# Patient Record
Sex: FEMALE | Race: OTHER | HISPANIC OR LATINO | Employment: STUDENT | URBAN - METROPOLITAN AREA
[De-identification: names, ages, dates, MRNs, and addresses within clinical notes are randomized per-mention and may not be internally consistent; named-entity substitution may affect disease eponyms.]

---

## 2021-02-09 ENCOUNTER — HOSPITAL ENCOUNTER (OUTPATIENT)
Dept: LAB | Facility: MEDICAL CENTER | Age: 21
End: 2021-02-09
Attending: NURSE PRACTITIONER
Payer: COMMERCIAL

## 2021-02-09 LAB
ALBUMIN SERPL BCP-MCNC: 4.6 G/DL (ref 3.2–4.9)
ALBUMIN/GLOB SERPL: 1.5 G/DL
ALP SERPL-CCNC: 66 U/L (ref 30–99)
ALT SERPL-CCNC: 104 U/L (ref 2–50)
ANION GAP SERPL CALC-SCNC: 11 MMOL/L (ref 7–16)
AST SERPL-CCNC: 34 U/L (ref 12–45)
BASOPHILS # BLD AUTO: 0.3 % (ref 0–1.8)
BASOPHILS # BLD: 0.02 K/UL (ref 0–0.12)
BILIRUB SERPL-MCNC: 0.5 MG/DL (ref 0.1–1.5)
BUN SERPL-MCNC: 10 MG/DL (ref 8–22)
CALCIUM SERPL-MCNC: 9.5 MG/DL (ref 8.5–10.5)
CHLORIDE SERPL-SCNC: 101 MMOL/L (ref 96–112)
CHOLEST SERPL-MCNC: 177 MG/DL (ref 100–199)
CO2 SERPL-SCNC: 24 MMOL/L (ref 20–33)
CREAT SERPL-MCNC: 0.45 MG/DL (ref 0.5–1.4)
EOSINOPHIL # BLD AUTO: 0.09 K/UL (ref 0–0.51)
EOSINOPHIL NFR BLD: 1.3 % (ref 0–6.9)
ERYTHROCYTE [DISTWIDTH] IN BLOOD BY AUTOMATED COUNT: 44.1 FL (ref 35.9–50)
EST. AVERAGE GLUCOSE BLD GHB EST-MCNC: 111 MG/DL
GLOBULIN SER CALC-MCNC: 3.1 G/DL (ref 1.9–3.5)
GLUCOSE SERPL-MCNC: 89 MG/DL (ref 65–99)
HBA1C MFR BLD: 5.5 % (ref 0–5.6)
HCT VFR BLD AUTO: 44.2 % (ref 37–47)
HDLC SERPL-MCNC: 56 MG/DL
HGB BLD-MCNC: 14.2 G/DL (ref 12–16)
IMM GRANULOCYTES # BLD AUTO: 0.01 K/UL (ref 0–0.11)
IMM GRANULOCYTES NFR BLD AUTO: 0.1 % (ref 0–0.9)
LDLC SERPL CALC-MCNC: 100 MG/DL
LYMPHOCYTES # BLD AUTO: 2.47 K/UL (ref 1–4.8)
LYMPHOCYTES NFR BLD: 35.3 % (ref 22–41)
MCH RBC QN AUTO: 30 PG (ref 27–33)
MCHC RBC AUTO-ENTMCNC: 32.1 G/DL (ref 33.6–35)
MCV RBC AUTO: 93.4 FL (ref 81.4–97.8)
MONOCYTES # BLD AUTO: 0.34 K/UL (ref 0–0.85)
MONOCYTES NFR BLD AUTO: 4.9 % (ref 0–13.4)
NEUTROPHILS # BLD AUTO: 4.06 K/UL (ref 2–7.15)
NEUTROPHILS NFR BLD: 58.1 % (ref 44–72)
NRBC # BLD AUTO: 0 K/UL
NRBC BLD-RTO: 0 /100 WBC
PLATELET # BLD AUTO: 235 K/UL (ref 164–446)
PMV BLD AUTO: 9.5 FL (ref 9–12.9)
POTASSIUM SERPL-SCNC: 3.6 MMOL/L (ref 3.6–5.5)
PROT SERPL-MCNC: 7.7 G/DL (ref 6–8.2)
RBC # BLD AUTO: 4.73 M/UL (ref 4.2–5.4)
SODIUM SERPL-SCNC: 136 MMOL/L (ref 135–145)
TRIGL SERPL-MCNC: 105 MG/DL (ref 0–149)
TSH SERPL DL<=0.005 MIU/L-ACNC: 1.28 UIU/ML (ref 0.38–5.33)
WBC # BLD AUTO: 7 K/UL (ref 4.8–10.8)

## 2021-02-09 PROCEDURE — 80061 LIPID PANEL: CPT

## 2021-02-09 PROCEDURE — 83036 HEMOGLOBIN GLYCOSYLATED A1C: CPT

## 2021-02-09 PROCEDURE — 85025 COMPLETE CBC W/AUTO DIFF WBC: CPT

## 2021-02-09 PROCEDURE — 36415 COLL VENOUS BLD VENIPUNCTURE: CPT

## 2021-02-09 PROCEDURE — 80053 COMPREHEN METABOLIC PANEL: CPT

## 2021-02-09 PROCEDURE — 84443 ASSAY THYROID STIM HORMONE: CPT

## 2024-11-02 ENCOUNTER — OFFICE VISIT (OUTPATIENT)
Dept: URGENT CARE | Facility: CLINIC | Age: 24
End: 2024-11-02
Payer: COMMERCIAL

## 2024-11-02 VITALS
HEIGHT: 63 IN | SYSTOLIC BLOOD PRESSURE: 116 MMHG | OXYGEN SATURATION: 98 % | DIASTOLIC BLOOD PRESSURE: 70 MMHG | TEMPERATURE: 98.1 F | HEART RATE: 65 BPM | BODY MASS INDEX: 33.66 KG/M2 | WEIGHT: 190 LBS | RESPIRATION RATE: 18 BRPM
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
## 2024-11-02 PROCEDURE — 3078F DIAST BP <80 MM HG: CPT | Performed by: STUDENT IN AN ORGANIZED HEALTH CARE EDUCATION/TRAINING PROGRAM

## 2024-11-02 PROCEDURE — 99203 OFFICE O/P NEW LOW 30 MIN: CPT | Performed by: STUDENT IN AN ORGANIZED HEALTH CARE EDUCATION/TRAINING PROGRAM

## 2024-11-02 PROCEDURE — 3074F SYST BP LT 130 MM HG: CPT | Performed by: STUDENT IN AN ORGANIZED HEALTH CARE EDUCATION/TRAINING PROGRAM

## 2024-11-02 ASSESSMENT — ENCOUNTER SYMPTOMS
TINGLING: 0
FEVER: 0
CHILLS: 0
SENSORY CHANGE: 0
WEAKNESS: 0

## 2024-11-02 NOTE — LETTER
PHYSICIAN’S AND CHIROPRACTIC PHYSICIAN'S   PROGRESS REPORT   CERTIFICATION OF DISABILITY Claim Number:     Social Security Number:    Patient’s Name: Gabbi Man Date of Injury: 11/2/2024   Employer:   Name of MCO (if applicable):      Patient’s Job Description/Occupation: canvasser       Previous Injuries/Diseases/Surgeries Contributing to the Condition:         Diagnosis: (W54.0XXA) Dog bite, initial encounter      Related to the Industrial Injury? Yes     Explain:        Objective Medical Findings: Puncture wound to posterior aspect of of lower leg secondary to dog bit. No active bleeding.  No surrounding erythema or swelling.  Minimally tender. FROM right leg. Gait intact.         None - Discharged                         Stable  No                 Ratable  No        Generally Improved                         Condition Worsened                  Condition Same  May Have Suffered a Permanent Disability No     Treatment Plan:    Augmentin, OTC tylenol/ibuprofen         No Change in Therapy                  PT/OT Prescribed                      Medication May be Used While Working        Case Management                          PT/OT Discontinued    Consultation    Further Diagnostic Studies:    Prescription(s)               X  Released to FULL DUTY /No Restrictions on (Date):       Certified TOTALLY TEMPORARILY DISABLED (Indicate Dates) From:   To:      Released to RESTRICTED/Modified Duty on (Date): From:   To:    Restrictions Are:  Temporary      No Sitting    No Standing    No Pulling Other:         No Bending at Waist     No Stooping     No Lifting        No Carrying     No Walking Lifting Restricted to (lbs.):          No Pushing        No Climbing     No Reaching Above Shoulders       Date of Next Visit:  11/5/2024 Date of this Exam: 11/2/2024 Physician/Chiropractic Physician Name: Patrica Camacho P.A.-C. Physician/Chiropractic Physician Signature:  Venancio Beltre DO MPH     [place]:   [address]  [facility], [address] [place], [place] [address] - Telephone [phone] [place]:  [address]  [facility], [address] [place], [place] [address] - Telephone [phone]    [email or web address]  D-39 (Rev. 10/24)

## 2024-11-02 NOTE — LETTER
"    EMPLOYEE’S CLAIM FOR COMPENSATION/ REPORT OF INITIAL TREATMENT  FORM C-4  PLEASE TYPE OR PRINT    EMPLOYEE’S CLAIM - PROVIDE ALL INFORMATION REQUESTED   First Name                    INNA Seo                  Last Name  Kenzie Man Birthdate                    2000                Sex  Female Claim Number (Insurer’s Use Only)     Home Address  [address] Age  23 y.o. Height  1.6 m (5' 3\") Weight  86.2 kg (190 lb) Social Security Number     [facility] Zip  [address] Telephone  There are no phone numbers on file.   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 (if applicable)  canvassing (knocking on doors voting)    How did this injury or occupational disease occur? (Be specific and answer in detail. Use additional sheet if necessary)  I knocked on the door. Man only said his big dog was kind to be scared. Then the big dog got close to me, while their small dog ran out and jumped and bit my right leg behing my knee   If you believe that you have an occupational disease, when did you first have knowledge of the disability and its relationship to your employment?  na Witnesses to the Accident (if applicable)  the owner, and coworker.      Nature of " Injury or Occupational Disease  Inflammation  Part(s) of Body Injured or Affected  Lower Leg (R) N/A N/A    I CERTIFY THAT THE ABOVE IS TRUE AND CORRECT TO T HE BEST OF MY KNOWLEDGE AND THAT I HAVE PROVIDED THIS INFORMATION IN ORDER TO OBTAIN THE BENEFITS OF [place]CC[place], OR CHAPTER 617 OF [place]RS).  I HEREBY AUTHORIZE ANY PHYSICIAN, CHIROPRACTOR, SURGEON, PRACTITIONER OR ANY OTHER PERSON, ANY HOSPITAL, INCLUDING [facility] OR [facility], ANY  MEDICAL SERVICE ORGANIZATION, ANY INSURANCE COMPANY, OR OTHER INSTITUTION OR ORGANIZATION TO RELEASE TO EACH OTHER, ANY MEDICAL OR OTHER INFORMATION, INCLUDING BENEFITS PAID OR PAYABLE, PERTINENT TO THIS INJURY OR DISEASE, EXCEPT INFORMATION RELATIVE TO DIAGNOSIS, TREATMENT AND/OR COUNSELING FOR AIDS, PSYCHOLOGICAL CONDITIONS, ALCOHOL OR CONTROLLED SUBSTANCES, FOR WHICH I MUST GIVE SPECIFIC AUTHORIZATION.  A PHOTOSTAT OF THIS AUTHORIZATION SHALL BE VALID AS THE ORIGINAL.     Date   Place Employee’s Original or  *Electronic Signature   THIS REPORT MUST BE COMPLETED AND MAILED WITHIN 3 WORKING DAYS OF TREATMENT   Place  [facility]    Name of [facility]   Date 11/2/2024 Diagnosis and Description of Injury or Occupational Disease  (W54.0XXA) Dog bite, initial encounter  The encounter diagnosis was Dog bite, initial encounter. Is there evidence that the injured employee was under the influence of alcohol and/or another controlled substance at the time of accident?  []No  [] Yes (if yes, please explain)   Hour 7:22 PM  No   Treatment:      Have you advised the patient to remain off work five days or more?   [] Yes Indicate dates: From   To    [] No      If no, is the injured employee capable of: [] full duty [] modified duty                     If modified duty, specify any limitations / restrictions:                                                                                                                                                                                                                                                                                                                                                                                                                   X-Ray Findings:      From information given by the employee, together with medical evidence, can you directly connect this injury or occupational disease as job incurred?  []Yes   [] No Yes    Is additional medical care by a physician indicated? []Yes [] No  Yes    Do you know of any previous injury or disease contributing to this condition or occupational disease? []Yes [] No (Explain if yes)                          No   Date  11/2/2024 Print Health Care Provider’s Name  Patrica Camacho P.A.-C. I certify that the employer’s copy of  this form was delivered to the employer on:   Address  [address] [address] INSURER'S USE ONLY                       [facility]  [address] Provider’s Tax ID Number  [id number]   Telephone  Dept: [phone]    Health Care Provider’s Original or Electronic Signature  salima-PATRICA Morfin P.A.-C. Degree (MD,DO, DC,PABrennaC,APRN)  PANARAYAN  Choose (if applicable)      ORIGINAL - TREATING HEALTHCARE PROVIDER PAGE 2 - INSURER/TPA PAGE 3 - EMPLOYER PAGE 4 - EMPLOYEE             Form C-4 (rev.08/23)

## 2024-11-02 NOTE — LETTER
PHYSICIAN’S AND CHIROPRACTIC PHYSICIAN'S   PROGRESS REPORT   CERTIFICATION OF DISABILITY Claim Number:     Social Security Number:    Patient’s Name: Gabbi Man Date of Injury: 11/2/2024   Employer:  [employer] [place] Name of MCO (if applicable):      Patient’s Job Description/Occupation: canvasser       Previous Injuries/Diseases/Surgeries Contributing to the Condition:         Diagnosis: (W54.0XXA) Dog bite, initial encounter      Related to the Industrial Injury? Yes     Explain:        Objective Medical Findings: Puncture wound to posterior aspect of of lower leg secondary to dog bit. No active bleeding.  No surrounding erythema or swelling.  Minimally tender. FROM right leg. Gait intact.         None - Discharged                         Stable  No                 Ratable  No        Generally Improved                         Condition Worsened                  Condition Same  May Have Suffered a Permanent Disability No     Treatment Plan:    Augmentin, OTC tylenol/ibuprofen         No Change in Therapy                  PT/OT Prescribed                      Medication May be Used While Working        Case Management                          PT/OT Discontinued    Consultation    Further Diagnostic Studies:    Prescription(s)               X  Released to FULL DUTY /No Restrictions on (Date):       Certified TOTALLY TEMPORARILY DISABLED (Indicate Dates) From:   To:      Released to RESTRICTED/Modified Duty on (Date): From:   To:    Restrictions Are:         No Sitting    No Standing    No Pulling Other:         No Bending at Waist     No Stooping     No Lifting        No Carrying     No Walking Lifting Restricted to (lbs.):          No Pushing        No Climbing     No Reaching Above Shoulders       Date of Next Visit:  11/5/2024 Date of this Exam: 11/2/2024 Physician/Chiropractic Physician Name: Patrica Camacho P.A.-C. Physician/Chiropractic Physician Signature:  DO NNEKA Cuevas  City:  [address] [place] [address], [address] [place], [place] [address] - Telephone [phone] [place]:  [address]  [facility] [address], [address] [place], [place] [address] - Telephone [phone]    [email or web address]  D-39 (Rev. 10/24)

## 2024-11-03 NOTE — PROGRESS NOTES
"Subjective     Gabbi Man is a 23 y.o. female who presents with Animal Bite (Bit in the back of R knee; unknown if dog had shops. 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  Work sent her here for evaluation.  Reports a little stinging initially after bite but now she has no pain. UTD on tetanus    HPI    Review of Systems   Constitutional:  Negative for chills and fever.   Musculoskeletal:  Negative for joint pain.   Neurological:  Negative for tingling, sensory change and weakness.   All other systems reviewed and are negative.             Objective     /70   Pulse 65   Temp 36.7 °C (98.1 °F)   Resp 18   Ht 1.6 m (5' 3\")   Wt 86.2 kg (190 lb)   SpO2 98%   BMI 33.66 kg/m²      Physical Exam  Vitals reviewed.   Constitutional:       Appearance: Normal appearance.   HENT:      Head: Normocephalic and atraumatic.      Nose: Nose normal.   Eyes:      Extraocular Movements: Extraocular movements intact.      Conjunctiva/sclera: Conjunctivae normal.      Pupils: Pupils are equal, round, and reactive to light.   Cardiovascular:      Rate and Rhythm: Normal rate.   Pulmonary:      Effort: Pulmonary effort is normal.   Musculoskeletal:         General: Normal range of motion.      Comments: Puncture wound to posterior aspect of of lower leg secondary to dog bit. No active bleeding.  No surrounding erythema or swelling.  Minimally tender. FROM right leg.   Skin:     General: Skin is warm and dry.   Neurological:      General: No focal deficit present.      Mental Status: She is alert and oriented to person, place, and time.                             Assessment & Plan        Assessment & Plan  Dog bite, initial encounter    Orders:    amoxicillin-clavulanate (AUGMENTIN) 875-125 MG Tab; Take 1 Tablet by mouth 2 times a day for 7 days.         C4 and D39 completed.  No work restrictions.   Follow up in " 3 days.      Differential diagnoses, supportive care measures (rest, ice, OTC Tylenol/ibuprofen) and indications for immediate follow-up discussed with patient. Pathogenesis of diagnosis discussed including typical length and natural progression.      Instructed to return to urgent care or nearest emergency department if symptoms fail to improve, for any change in condition, further concerns, or new concerning symptoms.    Patient states understanding and agrees with the plan of care and discharge instructions.

## 2024-11-06 ENCOUNTER — OCCUPATIONAL MEDICINE (OUTPATIENT)
Dept: URGENT CARE | Facility: CLINIC | Age: 24
End: 2024-11-06
Payer: COMMERCIAL

## 2024-11-06 VITALS
WEIGHT: 190 LBS | TEMPERATURE: 97.3 F | SYSTOLIC BLOOD PRESSURE: 102 MMHG | DIASTOLIC BLOOD PRESSURE: 62 MMHG | HEIGHT: 63 IN | OXYGEN SATURATION: 96 % | BODY MASS INDEX: 33.66 KG/M2 | HEART RATE: 59 BPM | RESPIRATION RATE: 17 BRPM

## 2024-11-06 DIAGNOSIS — S81.851D DOG BITE OF RIGHT LOWER LEG, SUBSEQUENT ENCOUNTER: ICD-10-CM
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
## 2024-11-06 PROCEDURE — 3078F DIAST BP <80 MM HG: CPT | Performed by: NURSE PRACTITIONER

## 2024-11-06 PROCEDURE — 99213 OFFICE O/P EST LOW 20 MIN: CPT | Performed by: NURSE PRACTITIONER

## 2024-11-06 PROCEDURE — 3074F SYST BP LT 130 MM HG: CPT | Performed by: NURSE PRACTITIONER

## 2024-11-06 NOTE — LETTER
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 \"  Patient returns urgent care for follow-up visit having improvement in symptoms.  Currently taking the antibiotics as directed.  Has no signs of infection.  Has no pain.        Diagnosis: (S81.851D,  W54.0XXD) Dog bite of right lower leg, subsequent encounter      Related to the Industrial Injury? Yes     Explain:        Objective Medical Findings: Right leg: Scabbed lesion posterior aspect right lower leg just distal to the knee.  No erythema, no induration.  No pain.  No discharge.         None - Discharged                         Stable  No                 Ratable  No     X   Generally Improved                         Condition Worsened                  Condition Same  May Have Suffered a Permanent Disability No     Treatment Plan:    Encouraged to finish course of antibiotics as directed.  Patient will be released to full duty without restrictions and discharged MMI         No Change in Therapy                  PT/OT Prescribed                      Medication May be Used While Working        Case Management                          PT/OT Discontinued    Consultation    Further Diagnostic Studies:    Prescription(s)               X  Released to FULL DUTY /No Restrictions on (Date):  11/6/2024    Certified TOTALLY TEMPORARILY DISABLED (Indicate Dates) From:   To:      Released to RESTRICTED/Modified Duty on (Date): From:   To:    Restrictions Are:  Permanent      No Sitting    No Standing    No Pulling Other:   "       No Bending at Waist     No Stooping     No Lifting        No Carrying     No Walking Lifting Restricted to (lbs.):          No Pushing        No Climbing     No Reaching Above Shoulders       Date of Next Visit:   N/A Date of this Exam: 11/6/2024 Physician/Chiropractic Physician Name: JUVENCIO Rodriguez Physician/Chiropractic Physician Signature:  Venancio Beltre DO MPH     [place]:  [facility]  [facility], [address] [place], [place] [address] - Telephone [phone] [place]:  [address] [place] [address], [address] [place], [place] [address] - Telephone [phone]    [email or web address]  D-39 (Rev. 10/24)

## 2024-11-07 NOTE — PROGRESS NOTES
"Subjective:   Gabbi Man is a 23 y.o. 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 \"  Patient returns urgent care for follow-up visit having improvement in symptoms.  Currently taking the antibiotics as directed.  Has no signs of infection.  Has no pain.    Review of Systems   Musculoskeletal:  Negative for joint pain.   Skin:         Dog bite right leg         Medications:    amoxicillin-clavulanate Tabs    Allergies: Latex    Problem List: Gabbi Man does not have a problem list on file.    Surgical History:  No past surgical history on file.    Past Social Hx: Gabbi Man  reports that she has never smoked. She has never used smokeless tobacco. She reports current alcohol use. She reports that she does not use drugs.     Past Family Hx:  Gabbi Man family history is not on file.     Problem list, medications, and allergies reviewed by myself today in Epic.     Objective:     /62 (BP Location: Left arm, Patient Position: Sitting, BP Cuff Size: Adult)   Pulse (!) 59   Temp 36.3 °C (97.3 °F) (Temporal)   Resp 17   Ht 1.6 m (5' 3\")   Wt 86.2 kg (190 lb)   SpO2 96%   BMI 33.66 kg/m²     Physical Exam  Constitutional:       Appearance: Normal appearance. She is not ill-appearing or toxic-appearing.   HENT:      Head: Normocephalic.      Right Ear: External ear normal.      Left Ear: External ear normal.      Nose: Nose normal.      Mouth/Throat:      Lips: Pink.   Eyes:      General: Lids are normal.   Pulmonary:      Effort: Pulmonary effort is normal. No accessory muscle usage.   Musculoskeletal:      Cervical back: Full passive range of motion without pain.   Skin:     Findings: Wound (healing) present.   Neurological:      Mental Status: She is alert and oriented to person, place, and time. "   Psychiatric:         Mood and Affect: Mood normal.         Thought Content: Thought content normal.           Right leg: Scabbed lesion posterior aspect right lower leg just distal to the knee.  No erythema, no induration.  No pain.  No discharge.      Assessment/Plan:     Diagnosis and associated orders:     1. Dog bite of right lower leg, subsequent encounter           Comments/MDM:       Encouraged to finish course of antibiotics as directed.  Patient will be released to full duty without restrictions and discharged MMI  Differential diagnosis, natural history, supportive care, and indications for immediate follow-up discussed.             Please note that this dictation was created using voice recognition software. I have made a reasonable attempt to correct obvious errors, but I expect that there are errors of grammar and possibly content that I did not discover before finalizing the note.    This note was electronically signed by Brent COOK.

## 2025-02-21 ENCOUNTER — HOSPITAL ENCOUNTER (OUTPATIENT)
Dept: LAB | Facility: MEDICAL CENTER | Age: 25
End: 2025-02-21
Attending: NEUROLOGICAL SURGERY
Payer: MEDICAID

## 2025-02-21 LAB
FSH SERPL-ACNC: 6.3 MIU/ML
LH SERPL-ACNC: 3.5 IU/L
PROLACTIN SERPL-MCNC: 26.4 NG/ML (ref 2.8–26)
T4 FREE SERPL-MCNC: 1.17 NG/DL (ref 0.93–1.7)
TSH SERPL-ACNC: 2.58 UIU/ML (ref 0.35–5.5)

## 2025-02-21 PROCEDURE — 84443 ASSAY THYROID STIM HORMONE: CPT

## 2025-02-21 PROCEDURE — 83001 ASSAY OF GONADOTROPIN (FSH): CPT

## 2025-02-21 PROCEDURE — 83002 ASSAY OF GONADOTROPIN (LH): CPT

## 2025-02-21 PROCEDURE — 83003 ASSAY GROWTH HORMONE (HGH): CPT

## 2025-02-21 PROCEDURE — 36415 COLL VENOUS BLD VENIPUNCTURE: CPT

## 2025-02-21 PROCEDURE — 84146 ASSAY OF PROLACTIN: CPT

## 2025-02-21 PROCEDURE — 84305 ASSAY OF SOMATOMEDIN: CPT

## 2025-02-21 PROCEDURE — 84439 ASSAY OF FREE THYROXINE: CPT

## 2025-02-23 LAB
IGF-I SERPL-MCNC: 204 NG/ML (ref 103–326)
IGF-I Z-SCORE SERPL: 0

## 2025-02-24 LAB — GHRH SERPL-MCNC: <0.05 NG/ML (ref 0.05–8)

## 2025-04-19 ENCOUNTER — OFFICE VISIT (OUTPATIENT)
Dept: URGENT CARE | Facility: CLINIC | Age: 25
End: 2025-04-19
Payer: MEDICAID

## 2025-04-19 VITALS
TEMPERATURE: 98.1 F | BODY MASS INDEX: 31.41 KG/M2 | SYSTOLIC BLOOD PRESSURE: 124 MMHG | HEIGHT: 63 IN | WEIGHT: 177.3 LBS | DIASTOLIC BLOOD PRESSURE: 82 MMHG | OXYGEN SATURATION: 98 % | HEART RATE: 60 BPM | RESPIRATION RATE: 18 BRPM

## 2025-04-19 DIAGNOSIS — S16.1XXA NECK STRAIN, INITIAL ENCOUNTER: ICD-10-CM

## 2025-04-19 PROCEDURE — 3079F DIAST BP 80-89 MM HG: CPT | Performed by: PHYSICIAN ASSISTANT

## 2025-04-19 PROCEDURE — 3074F SYST BP LT 130 MM HG: CPT | Performed by: PHYSICIAN ASSISTANT

## 2025-04-19 PROCEDURE — 99213 OFFICE O/P EST LOW 20 MIN: CPT | Performed by: PHYSICIAN ASSISTANT

## 2025-04-19 RX ORDER — NITROFURANTOIN 25; 75 MG/1; MG/1
CAPSULE ORAL
COMMUNITY
Start: 2025-04-16

## 2025-04-19 ASSESSMENT — ENCOUNTER SYMPTOMS: NECK PAIN: 1

## 2025-04-20 NOTE — PROGRESS NOTES
"Subjective:   Gabbi Tripathi is a 24 y.o. female who presents for Neck Injury (X1week neck injury/stiffness/discomfort/)      1 week ago patient fell with other person against object causing forced flexion of her neck. Felt sore immediately following.  Even prior to this injury she has had some intermittent subjective numbness of the left upper extremity, since then she feels like bilateral upper extremities occasionally feel slightly numb or slightly different.  She has not noted any overt weakness, she has no shooting pain down the extremities.  She has not noted any saddle anesthesia, bladder or bowel incontinence    Review of Systems   Musculoskeletal:  Positive for neck pain.       Medications, Allergies, and current problem list reviewed today in Epic.     Objective:     /82   Pulse 60   Temp 36.7 °C (98.1 °F) (Temporal)   Resp 18   Ht 1.6 m (5' 3\")   Wt 80.4 kg (177 lb 4.8 oz)   SpO2 98%     Physical Exam  Vitals reviewed.   Constitutional:       Appearance: Normal appearance.   HENT:      Head: Normocephalic and atraumatic.      Right Ear: External ear normal.      Left Ear: External ear normal.      Nose: Nose normal.      Mouth/Throat:      Mouth: Mucous membranes are moist.   Eyes:      Conjunctiva/sclera: Conjunctivae normal.   Neck:      Comments: Full range of motion of cervical spine.  No midline step-off crepitance tenderness or deformity.  Notable tenderness along the paraspinal muscle body of the cervical spine and upper thoracic region  Cardiovascular:      Rate and Rhythm: Normal rate.   Pulmonary:      Effort: Pulmonary effort is normal.   Skin:     General: Skin is warm and dry.      Capillary Refill: Capillary refill takes less than 2 seconds.   Neurological:      Mental Status: She is alert and oriented to person, place, and time.         Assessment/Plan:     Diagnosis and associated orders:     1. Neck strain, initial encounter  Referral to Sports Medicine       "   Comments/MDM:     No evidence of cord compression syndrome, she may be experiencing some mild radiculopathy but she is not noting any positional radiculopathic symptoms.  She also had some of the symptoms that predated the injury.  Recommend supportive care measures including heat, gentle stretching, good posture and if feeling if persistent symptoms recommend follow-up with sports medicine         Differential diagnosis, natural history, supportive care, and indications for immediate follow-up discussed.    Advised the patient to follow-up with the primary care physician for recheck, reevaluation, and consideration of further management.    Please note that this dictation was created using voice recognition software. I have made a reasonable attempt to correct obvious errors, but I expect that there are errors of grammar and possibly content that I did not discover before finalizing the note.    This note was electronically signed by Lee Forde PA-C

## 2025-04-25 NOTE — Clinical Note
REFERRAL APPROVAL NOTICE         Sent on April 25, 2025                   Gabbi Torres Bhumi  [address] [address]                   Dear Ms. MayDonovan,    After a careful review of the medical information and benefit coverage, [facility] has processed your referral. See below for additional details.    If applicable, you must be actively enrolled with your insurance for coverage of the authorized service. If you have any questions regarding your coverage, please contact your insurance directly.    REFERRAL INFORMATION   Referral #:  [id number]  Referred-To Provider    Referred-By Provider:  Sports Medicine    Lee Forde P.A.-C.   [facility]      [address]  [address]  [place] [address] [phone] [id number]  [facility] [address]  [phone]    Referral Start Date:  04/19/2025  Referral End Date:   04/19/2026             SCHEDULING  If you do not already have an appointment, please call [phone] to make an appointment.     MORE INFORMATION  If you do not already have a [employer] account, sign up at: [email or web address]  You can access your medical information, make appointments, see lab results, billing information, and more.  If you have questions regarding this referral, please contact  the [facility] Referrals department at:             [phone]. Monday - Friday 8:00AM - 5:00PM.     Sincerely,    [facility]

## 2025-04-26 ENCOUNTER — HOSPITAL ENCOUNTER (OUTPATIENT)
Dept: LAB | Facility: MEDICAL CENTER | Age: 25
End: 2025-04-26
Attending: NEUROLOGICAL SURGERY
Payer: MEDICAID

## 2025-04-26 LAB — CORTIS SERPL-MCNC: 12.1 UG/DL (ref 0–23)

## 2025-04-26 PROCEDURE — 82533 TOTAL CORTISOL: CPT

## 2025-04-26 PROCEDURE — 82024 ASSAY OF ACTH: CPT

## 2025-04-26 PROCEDURE — 36415 COLL VENOUS BLD VENIPUNCTURE: CPT

## 2025-04-28 LAB — ACTH PLAS-MCNC: 15 PG/ML (ref 7.2–63.3)
